# Patient Record
Sex: FEMALE
[De-identification: names, ages, dates, MRNs, and addresses within clinical notes are randomized per-mention and may not be internally consistent; named-entity substitution may affect disease eponyms.]

---

## 2024-04-16 ENCOUNTER — APPOINTMENT (OUTPATIENT)
Dept: NEUROLOGY | Facility: CLINIC | Age: 29
End: 2024-04-16
Payer: COMMERCIAL

## 2024-04-16 DIAGNOSIS — I10 ESSENTIAL (PRIMARY) HYPERTENSION: ICD-10-CM

## 2024-04-16 DIAGNOSIS — G43.009 MIGRAINE W/OUT AURA, NOT INTRACTABLE, W/OUT STATUS MIGRAINOSUS: ICD-10-CM

## 2024-04-16 PROBLEM — Z00.00 ENCOUNTER FOR PREVENTIVE HEALTH EXAMINATION: Status: ACTIVE | Noted: 2024-04-16

## 2024-04-16 PROCEDURE — 99213 OFFICE O/P EST LOW 20 MIN: CPT | Mod: 95

## 2024-04-16 RX ORDER — NIFEDIPINE 30 MG
30 TABLET, EXTENDED RELEASE ORAL
Refills: 0 | Status: ACTIVE | COMMUNITY

## 2024-04-16 NOTE — ASSESSMENT
[FreeTextEntry1] : This is a case of a 28 yr  female with PMH of HTN presents today with migraines well controlled on Nurtec.  No new symptoms or deficits, pt to establish care.   Plan:   {  } f/u in office 3 months  {  } Nurtec 75 mg as needed  {  } retrieve records and MRI records from previous neuro  Headache education provided: [] Stay well hydrated [] Limit excessive caffeine and alcohol intake [] Maintain good sleep hygiene [] Try to avoid triggers [] Practice good eating habits [] Avoid excessive use of over the counter pain medications, as they can cause medication overuse headaches  [] Keep a headache diary [] Relaxation techniques, biofeedback, massage therapy, acupunctures, and heating pads may be effective  The above plan was discussed with James Proctor in great detail.  James Proctor verbalized understanding and agrees with plan as detailed above. Patient was provided education and counselling on current diagnosis/symptoms. She was advised to call our clinic at [phone] for any new or worsening symptoms, or with any questions or concerns. In case of acute onset of neurological symptoms or worsening presentation, patient was advised to present to nearest emergency room for further evaluation.  James Proctor expressed understanding and all her questions/concerns were addressed.

## 2024-04-16 NOTE — HISTORY OF PRESENT ILLNESS
[Medical Office: ([facility])___] : at the medical office located in  [Home] : at home, [unfilled] , at the time of the visit. [Verbal consent obtained from patient] : the patient, [unfilled] [FreeTextEntry1] : This is a case of a 28 yr  female with PMH of HTN presents today with headaches.  Pt has had a history of headaches since childhood.   Pt was seeing neuro years ago Dr Nash? and headaches well controlled but MD retired and she needs to establish care at new office.  Headaches are 1 time a month lasting up to 3 days.  Located frontal/orbital.  Described as "irritating".  Pt has nausea and vomiting.  Pt also has photophobia denies phonophobia.  Denies visual complaints, doubled, blurred. Denies extremity weakness or slurred speech. Denies dizziness.  Has tried sumatriptan with no relief in the past.  Pt was prescribed nurtec which has been very successful Triggers: before to  after menstrual  Last MRI- yrs ago as well-reported as normal   Past medication: sumatriptan  Current medication: nifipine 30 mg Occupation: conductor Caffeine: once in awhile Allergies: NKA

## 2024-04-16 NOTE — PHYSICAL EXAM
[General Appearance - Alert] : alert [General Appearance - Well Nourished] : well nourished [General Appearance - Well-Appearing] : healthy appearing [Oriented To Time, Place, And Person] : oriented to person, place, and time [Affect] : the affect was normal [Memory Recent] : recent memory was not impaired [Person] : oriented to person [Place] : oriented to place [Time] : oriented to time [Cranial Nerves Accessory (XI - Cranial And Spinal)] : head turning and shoulder shrug symmetric [] : no respiratory distress

## 2024-04-17 RX ORDER — RIMEGEPANT SULFATE 75 MG/75MG
75 TABLET, ORALLY DISINTEGRATING ORAL
Qty: 16 | Refills: 3 | Status: ACTIVE | COMMUNITY
Start: 2024-04-16 | End: 1900-01-01

## 2024-08-06 ENCOUNTER — RX RENEWAL (OUTPATIENT)
Age: 29
End: 2024-08-06

## 2025-02-24 ENCOUNTER — RX RENEWAL (OUTPATIENT)
Age: 30
End: 2025-02-24